# Patient Record
Sex: FEMALE | Race: WHITE | NOT HISPANIC OR LATINO | URBAN - METROPOLITAN AREA
[De-identification: names, ages, dates, MRNs, and addresses within clinical notes are randomized per-mention and may not be internally consistent; named-entity substitution may affect disease eponyms.]

---

## 2023-07-09 ENCOUNTER — EMERGENCY (EMERGENCY)
Facility: HOSPITAL | Age: 23
LOS: 1 days | Discharge: HOME | End: 2023-07-09
Attending: EMERGENCY MEDICINE | Admitting: EMERGENCY MEDICINE
Payer: COMMERCIAL

## 2023-07-09 VITALS
HEIGHT: 67 IN | SYSTOLIC BLOOD PRESSURE: 124 MMHG | DIASTOLIC BLOOD PRESSURE: 84 MMHG | OXYGEN SATURATION: 100 % | HEART RATE: 90 BPM | WEIGHT: 143.3 LBS | TEMPERATURE: 99 F | RESPIRATION RATE: 17 BRPM
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
## 2023-07-09 DIAGNOSIS — M54.89 OTHER DORSALGIA: ICD-10-CM
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
## 2023-07-09 DIAGNOSIS — M54.6 PAIN IN THORACIC SPINE: ICD-10-CM

## 2023-07-09 PROCEDURE — 96372 THER/PROPH/DIAG INJ SC/IM: CPT

## 2023-07-09 PROCEDURE — 72128 CT CHEST SPINE W/O DYE: CPT | Mod: MA

## 2023-07-09 PROCEDURE — 72128 CT CHEST SPINE W/O DYE: CPT | Mod: 26,MA

## 2023-07-09 PROCEDURE — 99284 EMERGENCY DEPT VISIT MOD MDM: CPT

## 2023-07-09 PROCEDURE — 99284 EMERGENCY DEPT VISIT MOD MDM: CPT | Mod: 25

## 2023-07-09 RX ORDER — LIDOCAINE 4 G/100G
1 CREAM TOPICAL ONCE
Refills: 0 | Status: COMPLETED | OUTPATIENT
Start: 2023-07-09 | End: 2023-07-09

## 2023-07-09 RX ORDER — ACETAMINOPHEN 500 MG
975 TABLET ORAL ONCE
Refills: 0 | Status: COMPLETED | OUTPATIENT
Start: 2023-07-09 | End: 2023-07-09

## 2023-07-09 RX ORDER — KETOROLAC TROMETHAMINE 30 MG/ML
15 SYRINGE (ML) INJECTION ONCE
Refills: 0 | Status: DISCONTINUED | OUTPATIENT
Start: 2023-07-09 | End: 2023-07-09

## 2023-07-09 RX ORDER — DIAZEPAM 5 MG
2 TABLET ORAL ONCE
Refills: 0 | Status: DISCONTINUED | OUTPATIENT
Start: 2023-07-09 | End: 2023-07-09

## 2023-07-09 RX ORDER — DIAZEPAM 5 MG
1 TABLET ORAL
Qty: 10 | Refills: 0
Start: 2023-07-09 | End: 2023-07-13

## 2023-07-09 RX ADMIN — LIDOCAINE 1 PATCH: 4 CREAM TOPICAL at 19:49

## 2023-07-09 RX ADMIN — Medication 975 MILLIGRAM(S): at 19:49

## 2023-07-09 RX ADMIN — Medication 15 MILLIGRAM(S): at 19:49

## 2023-07-09 RX ADMIN — Medication 2 MILLIGRAM(S): at 19:48

## 2023-07-09 NOTE — ED PROVIDER NOTE - CLINICAL SUMMARY MEDICAL DECISION MAKING FREE TEXT BOX
pt here after mechanical fall on stairs onto left side yesterday. sent from urgent care for further imaging after XR hip / lspine (unk results).   pt well appearing, stable vitals, exam reassuring - paraspinal tenderness to left thoracic region. no midline tenderness. no lumbar / cervical tenderness. neuro intact  suspect contusion, sprain / strain. no midline tenderness to suggest spinal injury.   tenderness to thoracic paraspinal region but overlying ribs, will get ct tspine imaging r/o acute injury  no concern for intra-abdominal injury, no bruising to abdomen, no abd tenderness. UA from urgent care reviewed and no hematuria.   plan - imaging  analgesia prn  reassess

## 2023-07-09 NOTE — ED PROVIDER NOTE - NS ED ATTENDING STATEMENT MOD
This was a shared visit with the JERILYN. I reviewed and verified the documentation and independently performed the documented:

## 2023-07-09 NOTE — ED PROVIDER NOTE - PATIENT PORTAL LINK FT
You can access the FollowMyHealth Patient Portal offered by [facility] by registering at the following website: [email or web address]. By joining [facility]’s FollowMyHealth portal, you will also be able to view your health information using other applications (apps) compatible with our system.

## 2023-07-09 NOTE — ED PROVIDER NOTE - OBJECTIVE STATEMENT
23 yr old female, denies medical history, presents to the Emergency Department w back pain. pt slipped and fell on wet stairs yesterday evening  took tylenol / motrin this afternoon w some relief.   went to urgent care had XR L Hip and Lspine - does not know results  was sent for further evaluation 23 yr old female, denies medical history, presents to the Emergency Department w back pain. pt slipped and fell on wet stairs yesterday evening. fell landing mostly on left side. no head strike, no loc, no ac/asa use. today w pain to left mid back. took tylenol / motrin this afternoon w some relief.   went to urgent care had XR L Hip and Lspine - does not know results. was sent for further evaluation.  no pain radiation to lower extremity or abdomen. no extremity weakness / numbness / tingling. no bladder / bowel dysfunction. no hematuria. able to ambulate.

## 2023-07-09 NOTE — ED ADULT NURSE NOTE - OBJECTIVE STATEMENT
23 year old F patient with c/o of LL back pain after a mechanical fall.  Denies blood in urine.  No distress noted.  Breathing easily and unlabored.  A+OX3, ambulatory w steady gait.  Denies numbness tingling in legs.

## 2023-07-09 NOTE — ED PROVIDER NOTE - PHYSICAL EXAMINATION
Constitutional : Well appearing, non-toxic, no acute distress. awake, alert, oriented to person, place, time/situation.  Head : head normocephalic, atraumatic  EENMT : eyes clear bilaterally, PERRL, EOMI. airway patent. moist mucous membranes. neck supple.  Cardiac : Normal rate, regular rhythm. No murmur appreciated.  Resp : Breath sounds clear and equal bilaterally. Respirations even and unlabored.   Gastro : abdomen soft, nontender, nondistended. no rebound or guarding. no CVAT.  MSK :  range of motion is not limited, no muscle or joint tenderness  Back : No evidence of trauma. No spinal or CVA tenderness.  Vasc : Extremities warm and well perfused. 2+ radial and DP pulses. cap refill <2 seconds  Neuro : Alert and oriented, CNII-XII grossly intact, no focal deficits, no motor or sensory deficits.  Skin : Skin normal color for race, warm, dry and intact. No evidence of rash.  Psych : Alert and oriented to person, place, time/situation. normal mood and affect. no apparent risk to self or others. Constitutional : Well appearing, non-toxic, no acute distress. awake, alert, oriented to person, place, time/situation.  Head : head normocephalic, atraumatic  EENMT : eyes clear bilaterally, PERRL, EOMI. airway patent. moist mucous membranes. neck supple.  Cardiac : Normal rate, regular rhythm. No murmur appreciated.  Resp : Breath sounds clear and equal bilaterally. Respirations even and unlabored.   Gastro : abdomen soft, nontender, nondistended. no rebound or guarding. no CVAT. no flank or abdominal bruising.   MSK :  range of motion is not limited, no muscle or joint tenderness  Back : No evidence of trauma. L mid back paraspinal tenderness. no midline tenderness. no lumbar or cervical tenderness.   Vasc : Extremities warm and well perfused. 2+ radial and DP pulses. cap refill <2 seconds  Neuro : Alert and oriented, CNII-XII grossly intact, no focal deficits, no motor or sensory deficits.  Skin : Skin normal color for race, warm, dry and intact. No evidence of rash.  Psych : Alert and oriented to person, place, time/situation. normal mood and affect. no apparent risk to self or others.

## 2023-07-09 NOTE — ED ADULT TRIAGE NOTE - CHIEF COMPLAINT QUOTE
Pt c/o L lower back pain x yesterday s/p slip and fall on stairs, sent by  for further eval. Denies numbness/tingling, head strike, LOC. No pmh

## 2023-07-09 NOTE — ED ADULT NURSE NOTE - NSFALLUNIVINTERV_ED_ALL_ED
Bed/Stretcher in lowest position, wheels locked, appropriate side rails in place/Call bell, personal items and telephone in reach/Instruct patient to call for assistance before getting out of bed/chair/stretcher/Non-slip footwear applied when patient is off stretcher/[place] to call system/Physically safe environment - no spills, clutter or unnecessary equipment/Purposeful proactive rounding/Room/bathroom lighting operational, light cord in reach

## 2023-07-09 NOTE — ED PROVIDER NOTE - PROGRESS NOTE DETAILS
CT prelim w/o acute pathology  pain improved after meds in ED. pt ok for dc and outpt follow up    All results reviewed with the patient verbally. Discharge plan and return precautions d/w pt who verbalized understanding and agrees with plan. All questions answered. Vitals WNL. Ready for d/c.

## 2023-07-09 NOTE — ED PROVIDER NOTE - NSFOLLOWUPINSTRUCTIONS_ED_ALL_ED_FT
In general, avoid heavy lifting and strenuous activities.  Please try to avoid bedrest or lack of movement given that may sometimes exacerbate the symptoms. Consider using ice or heat, whichever feels better, as desired.  Take medications as directed to help with symptoms - see below for medications.     Follow up with a primary care doctor within 1 week to ensure your symptoms have improved.     Return to the Emergency Department for persistent, worsening or new symptoms including severe back pain, any numbness/tingling/paresthesias, inability to walk, loss of bowels of urine, severe abdominal pain, chest pain, shortness of breath, or any other serious concerns.      MEDICATIONS --     1) tylenol / motrin  PAIN MEDICATION - as needed for symptoms - For symptoms take Motrin and tylenol - THESE MEDICATIONS DO NOT REQUIRE A PRESCRIPTION AND CAN BE PURCHASED IN ANY PHARMACY. Take motrin 600mg every 6 hours as needed, take with food - discontinue use if you notice abdominal pain, blood in stool or black stools. AND / OR... Take tylenol as needed - 650mg every 6 hours as needed, do not exceed 4000mg in 24 hours. You can take one medication or the other. Or, if one medication alone does not seem to be working, you can actually take both tylenol and motrin together and that is acceptable / safe but you should alternate the medications so you are taking something every few hours; consider the following regimen - Take one medication every 3 hours. 7am motrin with breakfast, 10am tylenol, 1pm motirn with lunch, 4pm tylenol, 7pm motrin with dinner, 10pm tylneol before bed.    2) VALIUM - Muscle Relaxer - 2mg every 12 hours for muscle spasms, take this only as needed for severe symptoms not controlled by the other medications suggested. Of note, this medication can be sedating and may cause drowsiness, do not  or operate machinery while taking this medication. You can also consider taking it only at night to help you sleep - PRESCRIPTIONS SENT ELECTRONICALLY TO YOUR PHARMACY, SEE BELOW FOR DETAILS...    3) LIDOCAINE PATCH - over the counter. use as directed. wear for 12 hours. remove for 12 hours.

## 2023-07-09 NOTE — ED ADULT NURSE NOTE - CAS EDP DISCH TYPE
Called and schedule appointment for device @ 2:00 as patient is already seeing Dary 4/17/2019 @ 3:00 and patient wanted appointment to be on same day   Home

## 2024-01-01 NOTE — ED ADULT NURSE NOTE - CHIEF COMPLAINT QUOTE
Pt c/o L lower back pain x yesterday s/p slip and fall on stairs, sent by  for further eval. Denies numbness/tingling, head strike, LOC. No pmh Yes - the patient is able to be screened